# Patient Record
Sex: MALE | Race: WHITE | Employment: PART TIME | URBAN - METROPOLITAN AREA
[De-identification: names, ages, dates, MRNs, and addresses within clinical notes are randomized per-mention and may not be internally consistent; named-entity substitution may affect disease eponyms.]

---

## 2019-05-14 ENCOUNTER — HOSPITAL ENCOUNTER (EMERGENCY)
Age: 34
Discharge: HOME | End: 2019-05-14

## 2019-05-14 ENCOUNTER — APPOINTMENT (OUTPATIENT)
Dept: GENERAL RADIOLOGY | Age: 34
End: 2019-05-14

## 2019-05-14 VITALS
DIASTOLIC BLOOD PRESSURE: 80 MMHG | BODY MASS INDEX: 36.53 KG/M2 | SYSTOLIC BLOOD PRESSURE: 142 MMHG | HEART RATE: 81 BPM | OXYGEN SATURATION: 99 % | HEIGHT: 76 IN | RESPIRATION RATE: 16 BRPM | WEIGHT: 300 LBS

## 2019-05-14 DIAGNOSIS — R03.0 ELEVATED BLOOD-PRESSURE READING, WITHOUT DIAGNOSIS OF HYPERTENSION: ICD-10-CM

## 2019-05-14 DIAGNOSIS — S63.234A SUBLUXATION OF PROXIMAL INTERPHALANGEAL JOINT OF RIGHT RING FINGER, INITIAL ENCOUNTER: Primary | ICD-10-CM

## 2019-05-14 PROCEDURE — 99283 EMERGENCY DEPT VISIT LOW MDM: CPT

## 2019-05-14 PROCEDURE — 73130 X-RAY EXAM OF HAND: CPT

## 2019-05-15 ENCOUNTER — TELEPHONE (OUTPATIENT)
Dept: ORTHOPEDIC SURGERY | Age: 34
End: 2019-05-15

## 2019-05-15 NOTE — TELEPHONE ENCOUNTER
Attempted to call patient re: chronic pip joint fracture dislocation but unable to get through or leave a message.

## 2019-05-15 NOTE — ED PROVIDER NOTES
SpO2 99%   BMI 36.52 kg/m²   Oxygen Saturation Interpretation: Normal  Vitals:    05/14/19 1955 05/14/19 2107   BP: (!) 173/98 (!) 142/80   Pulse: 67 81   Resp: 14 16   SpO2: 97% 99%   Weight: 300 lb (136.1 kg)    Height: 6' 4\" (1.93 m)          ---------------------------------------------------PHYSICAL EXAM--------------------------------------      Constitutional/General: Alert and oriented x3, well appearing, non toxic in NAD  Head: NC/AT  Eye, sclera clear. Pulmonary: Lungs clear to auscultation bilaterally, no wheezes, rales, or rhonchi. Not in respiratory distress  Cardiovascular:  Regular rate and rhythm, no murmurs, gallops, or rubs. 2+ distal pulses  Extremities: pt is right hand dominant. Right index finger is deviate laterally slightly at the PIP joint without erythema, edema, other color change. He has full range of motion of the affected finger at the MCP. Otherwise the affected finger, hand, wrist, elbow; with FROM active and passive w/o crepitus, pulses +2, reflexes +2, temperature, strength, color, cap refill <2 secs, normal and all equal to the left. Skin: warm and dry without rash  Neurologic: GCS 15, Face is symmetric (VII), EOMs are intact (III, IV, VI), pupils are equal and reactive (II, III), tongue is midline (XII). The patient is walking in a smooth balanced and coordinated fashion w/o bumping or walking into anything (vision), talking w/ appropriate content and fluency, is fully attentive, and provided a spontaneous coherent history. Psych: Normal Affect      ------------------------------------------PROGRESS NOTES -------------------------------------------    MDM  Initial BP elevated and re-check  Your blood pressure was elevated today. Therefore, you need to have a follow up visit with your PCP or clinic to further evaluate the need for chronic medications to control your blood pressure.  Untreated or poorly controlled high blood pressure can lead to serious health problems and even death. Remote injury with Xray revealing no new injury and required orthopedic hand specialist for further eval and management of this non-acute condition. There are no signs of comparment issues. Pt states his understanding that permanent damage to the affected digit may have already occurred but may be able to be improved by specialized orthopedic hand specialist care and this was strongly encouraged with him stating his verbal understanding. ED COURSE MEDICATIONS:              Medications - No data to display            COUNSELING:   I have spoken with the patient and discussed todays results, in addition to providing specific details for the plan of care and counseling regarding the diagnosis and prognosis.     --------------------------------------- IMPRESSION & DISPOSITION --------------------------------     IMPRESSION(s):  1. Subluxation of proximal interphalangeal joint of right ring finger, initial encounter    2. Elevated blood-pressure reading, without diagnosis of hypertension          DISPOSITION:  Disposition: Discharge to home. Patient condition is good. NOTE: This report was transcribed using voice recognition software. Every effort was made to ensure accuracy; however, inadvertent computerized transcription errors may be present.              SAYDA Canela - NASIR  05/14/19 [id number]

## 2019-05-15 NOTE — ED NOTES
PT was playing basketball a month ago. Pt stubbed his right right finger. The finger has a bent to it. Pt is pain when he moves it. No family with pt.      Desi Mascorro RN  05/14/19 2014

## 2023-04-16 ENCOUNTER — HOSPITAL ENCOUNTER (EMERGENCY)
Age: 38
Discharge: HOME | End: 2023-04-17
Attending: EMERGENCY MEDICINE

## 2023-04-16 DIAGNOSIS — K64.9 HEMORRHOIDS, UNSPECIFIED HEMORRHOID TYPE: ICD-10-CM

## 2023-04-16 DIAGNOSIS — R11.0 NAUSEA: Primary | ICD-10-CM

## 2023-04-16 DIAGNOSIS — R19.7 DIARRHEA, UNSPECIFIED TYPE: ICD-10-CM

## 2023-04-16 DIAGNOSIS — K62.5 RECTAL BLEEDING: ICD-10-CM

## 2023-04-16 LAB
ANION GAP SERPL CALCULATED.3IONS-SCNC: 11 MMOL/L (ref 7–16)
BASOPHILS # BLD: 0.04 E9/L (ref 0–0.2)
BASOPHILS NFR BLD: 0.3 % (ref 0–2)
BUN SERPL-MCNC: 10 MG/DL (ref 6–20)
CALCIUM SERPL-MCNC: 10 MG/DL (ref 8.6–10.2)
CHLORIDE SERPL-SCNC: 101 MMOL/L (ref 98–107)
CO2 SERPL-SCNC: 25 MMOL/L (ref 22–29)
CREAT SERPL-MCNC: 0.8 MG/DL (ref 0.7–1.2)
EOSINOPHIL # BLD: 0.12 E9/L (ref 0.05–0.5)
EOSINOPHIL NFR BLD: 0.9 % (ref 0–6)
ERYTHROCYTE [DISTWIDTH] IN BLOOD BY AUTOMATED COUNT: 12.1 FL (ref 11.5–15)
GLUCOSE SERPL-MCNC: 103 MG/DL (ref 74–99)
HCT VFR BLD AUTO: 42.5 % (ref 37–54)
HGB BLD-MCNC: 14.3 G/DL (ref 12.5–16.5)
IMM GRANULOCYTES # BLD: 0.16 E9/L
IMM GRANULOCYTES NFR BLD: 1.2 % (ref 0–5)
LYMPHOCYTES # BLD: 1.38 E9/L (ref 1.5–4)
LYMPHOCYTES NFR BLD: 10.3 % (ref 20–42)
MCH RBC QN AUTO: 30.7 PG (ref 26–35)
MCHC RBC AUTO-ENTMCNC: 33.6 % (ref 32–34.5)
MCV RBC AUTO: 91.2 FL (ref 80–99.9)
MONOCYTES # BLD: 1.01 E9/L (ref 0.1–0.95)
MONOCYTES NFR BLD: 7.6 % (ref 2–12)
NEUTROPHILS # BLD: 10.66 E9/L (ref 1.8–7.3)
NEUTS SEG NFR BLD: 79.7 % (ref 43–80)
PLATELET # BLD AUTO: 240 E9/L (ref 130–450)
PMV BLD AUTO: 8.9 FL (ref 7–12)
POTASSIUM SERPL-SCNC: 4.3 MMOL/L (ref 3.5–5)
RBC # BLD AUTO: 4.66 E12/L (ref 3.8–5.8)
SODIUM SERPL-SCNC: 137 MMOL/L (ref 132–146)
WBC # BLD: 13.4 E9/L (ref 4.5–11.5)

## 2023-04-16 PROCEDURE — 6360000002 HC RX W HCPCS

## 2023-04-16 PROCEDURE — 85025 COMPLETE CBC W/AUTO DIFF WBC: CPT

## 2023-04-16 PROCEDURE — 80076 HEPATIC FUNCTION PANEL: CPT

## 2023-04-16 PROCEDURE — 87502 INFLUENZA DNA AMP PROBE: CPT

## 2023-04-16 PROCEDURE — 87635 SARS-COV-2 COVID-19 AMP PRB: CPT

## 2023-04-16 PROCEDURE — 83690 ASSAY OF LIPASE: CPT

## 2023-04-16 PROCEDURE — 96374 THER/PROPH/DIAG INJ IV PUSH: CPT

## 2023-04-16 PROCEDURE — 99285 EMERGENCY DEPT VISIT HI MDM: CPT

## 2023-04-16 PROCEDURE — 80048 BASIC METABOLIC PNL TOTAL CA: CPT

## 2023-04-16 RX ORDER — 0.9 % SODIUM CHLORIDE 0.9 %
1000 INTRAVENOUS SOLUTION INTRAVENOUS ONCE
Status: COMPLETED | OUTPATIENT
Start: 2023-04-16 | End: 2023-04-17

## 2023-04-16 RX ORDER — ONDANSETRON 2 MG/ML
4 INJECTION INTRAMUSCULAR; INTRAVENOUS ONCE
Status: COMPLETED | OUTPATIENT
Start: 2023-04-16 | End: 2023-04-16

## 2023-04-16 RX ADMIN — ONDANSETRON 4 MG: 2 INJECTION INTRAMUSCULAR; INTRAVENOUS at 23:59

## 2023-04-16 ASSESSMENT — PAIN SCALES - GENERAL: PAINLEVEL_OUTOF10: 8

## 2023-04-16 ASSESSMENT — PAIN DESCRIPTION - ORIENTATION: ORIENTATION: LEFT;LOWER

## 2023-04-16 ASSESSMENT — PAIN DESCRIPTION - LOCATION: LOCATION: ABDOMEN

## 2023-04-16 ASSESSMENT — LIFESTYLE VARIABLES
HOW OFTEN DO YOU HAVE A DRINK CONTAINING ALCOHOL: MONTHLY OR LESS
HOW MANY STANDARD DRINKS CONTAINING ALCOHOL DO YOU HAVE ON A TYPICAL DAY: 1 OR 2

## 2023-04-16 ASSESSMENT — PAIN - FUNCTIONAL ASSESSMENT: PAIN_FUNCTIONAL_ASSESSMENT: 0-10

## 2023-04-17 ENCOUNTER — APPOINTMENT (OUTPATIENT)
Dept: CT IMAGING | Age: 38
End: 2023-04-17

## 2023-04-17 VITALS
TEMPERATURE: 97.3 F | WEIGHT: 315 LBS | RESPIRATION RATE: 18 BRPM | OXYGEN SATURATION: 98 % | BODY MASS INDEX: 38.36 KG/M2 | HEART RATE: 70 BPM | DIASTOLIC BLOOD PRESSURE: 88 MMHG | HEIGHT: 76 IN | SYSTOLIC BLOOD PRESSURE: 160 MMHG

## 2023-04-17 LAB
ALBUMIN SERPL-MCNC: 4.4 G/DL (ref 3.5–5.2)
ALP SERPL-CCNC: 40 U/L (ref 40–129)
ALT SERPL-CCNC: 34 U/L (ref 0–40)
AST SERPL-CCNC: 27 U/L (ref 0–39)
BILIRUB DIRECT SERPL-MCNC: <0.2 MG/DL (ref 0–0.3)
BILIRUB INDIRECT SERPL-MCNC: NORMAL MG/DL (ref 0–1)
BILIRUB SERPL-MCNC: 0.4 MG/DL (ref 0–1.2)
INFLUENZA A BY PCR: NOT DETECTED
INFLUENZA B BY PCR: NOT DETECTED
LACTATE BLDV-SCNC: 0.9 MMOL/L (ref 0.5–1.9)
LACTATE BLDV-SCNC: 1 MMOL/L (ref 0.5–1.9)
LIPASE: 23 U/L (ref 13–60)
PROT SERPL-MCNC: 7.1 G/DL (ref 6.4–8.3)
SARS-COV-2 RDRP RESP QL NAA+PROBE: NOT DETECTED

## 2023-04-17 PROCEDURE — 2580000003 HC RX 258: Performed by: RADIOLOGY

## 2023-04-17 PROCEDURE — 74177 CT ABD & PELVIS W/CONTRAST: CPT

## 2023-04-17 PROCEDURE — 6360000004 HC RX CONTRAST MEDICATION: Performed by: RADIOLOGY

## 2023-04-17 PROCEDURE — 2580000003 HC RX 258

## 2023-04-17 PROCEDURE — 83605 ASSAY OF LACTIC ACID: CPT

## 2023-04-17 RX ORDER — SODIUM CHLORIDE 0.9 % (FLUSH) 0.9 %
10 SYRINGE (ML) INJECTION PRN
Status: COMPLETED | OUTPATIENT
Start: 2023-04-17 | End: 2023-04-17

## 2023-04-17 RX ORDER — ONDANSETRON 4 MG/1
4 TABLET, ORALLY DISINTEGRATING ORAL 3 TIMES DAILY PRN
Qty: 21 TABLET | Refills: 0 | Status: SHIPPED | OUTPATIENT
Start: 2023-04-17

## 2023-04-17 RX ADMIN — SODIUM CHLORIDE, PRESERVATIVE FREE 10 ML: 5 INJECTION INTRAVENOUS at 00:40

## 2023-04-17 RX ADMIN — IOPAMIDOL 75 ML: 755 INJECTION, SOLUTION INTRAVENOUS at 00:42

## 2023-04-17 RX ADMIN — SODIUM CHLORIDE 1000 ML: 9 INJECTION, SOLUTION INTRAVENOUS at 00:00

## 2023-04-17 NOTE — DISCHARGE INSTRUCTIONS
Please return to the emergency department if you develop new or worsening symptoms.  Follow up at the information provided

## 2023-04-17 NOTE — ED PROVIDER NOTES
gallbladder is unremarkable. No enhancing renal mass. No obstructive hydroureteronephrosis. GI/Bowel: Normal appendix. No ileus or obstruction is identified no significant stool volume in the colon. No focal inflammatory bowel wall thickening. Pelvis: No pelvic mass. The bladder is unremarkable. No free fluid in the pelvis. Peritoneum/Retroperitoneum: No aortic aneurysm. No dissection. No retroperitoneal or mesenteric lymphadenopathy based on size criteria. Shotty lymph nodes incidentally noted. No bowel herniation. Tiny fat containing umbilical hernia. Bones/Soft Tissues: No acute subcutaneous soft tissue abnormality. Degenerative changes are seen in the left sacroiliac joint. Multilevel degenerative changes seen in the lumbar spine. Pars defects seen at L5 without spondylolisthesis. 1. No urinary tract calculi or obstruction. 2. No ileus, obstruction, diverticulitis or acute inflammatory bowel process. 3. No acute abnormality is identified in the left lower quadrant to explain patient's pain. 4. Multilevel degenerative changes in the spine, with pars defects at L5 though no spondylolisthesis. 5. Asymmetric sacroiliitis in the left SI joint. No results found. PROCEDURES   Unless otherwise noted below, none    PAST MEDICAL HISTORY/Chronic Conditions Affecting Care      has no past medical history on file.      EMERGENCY DEPARTMENT COURSE    Vitals:    Vitals:    04/16/23 2208 04/16/23 2239 04/17/23 0003 04/17/23 0220   BP: (!) 196/104 (!) 174/98 (!) 164/80 (!) 160/88   Pulse: 56  62 70   Resp: 16  16 18   Temp: 97.3 °F (36.3 °C)      TempSrc: Temporal      SpO2: 100%  100% 98%   Weight: (!) 315 lb (142.9 kg)      Height: 6' 4\" (1.93 m)          Patient was given the following medications:  Medications   ondansetron (ZOFRAN) injection 4 mg (4 mg IntraVENous Given 4/16/23 5341)   0.9 % sodium chloride bolus (0 mLs IntraVENous Stopped 4/17/23 0202)   iopamidol (ISOVUE-370) 76 % injection 75